# Patient Record
Sex: MALE | Race: WHITE | NOT HISPANIC OR LATINO | URBAN - METROPOLITAN AREA
[De-identification: names, ages, dates, MRNs, and addresses within clinical notes are randomized per-mention and may not be internally consistent; named-entity substitution may affect disease eponyms.]

---

## 2017-01-18 ENCOUNTER — GENERIC CONVERSION - ENCOUNTER (OUTPATIENT)
Dept: OTHER | Facility: OTHER | Age: 52
End: 2017-01-18

## 2018-01-11 ENCOUNTER — ALLSCRIPTS OFFICE VISIT (OUTPATIENT)
Dept: OTHER | Facility: OTHER | Age: 53
End: 2018-01-11

## 2018-01-11 DIAGNOSIS — Z13.1 ENCOUNTER FOR SCREENING FOR DIABETES MELLITUS: ICD-10-CM

## 2018-01-11 DIAGNOSIS — Z13.220 ENCOUNTER FOR SCREENING FOR LIPOID DISORDERS: ICD-10-CM

## 2018-01-11 DIAGNOSIS — Z12.5 ENCOUNTER FOR SCREENING FOR MALIGNANT NEOPLASM OF PROSTATE: ICD-10-CM

## 2018-01-12 NOTE — PROGRESS NOTES
Assessment   1  Acute sinusitis (461 9) (J01 90)  2  Screening for depression (V79 0) (Z13 89)  3  Screening for diabetes mellitus (V77 1) (Z13 1)1   4  Lipid screening (V77 91) (Z13 220)1   5  Screening PSA (prostate specific antigen) (V76 44) (Z12 5)1      1 Amended By: Susana Correa; Jan 11 2018 11:51 AM EST      Plan   Acute sinusitis    · Start: Cefuroxime Axetil 500 MG Oral Tablet; TAKE 1 TABLET EVERY 12 HOURS DAILY  Screening for depression    · *VB-Depression Screening; Status:Complete - Retrospective By Protocol Authorization;      Done: [id number] 11:30AM   · *VB-Depression Screening ; every 1 year; Last 91PVC8240; Next [id number];    Status:Active      I suspect the pt has sinusitis  Recommended rest, fluids and supportive care and rxd abx  Discussion/Summary   Possible side effects of new medications were reviewed with the patient/guardian today  The treatment plan was reviewed with the patient/guardian  The patient/guardian understands and agrees with the treatment plan      Chief Complaint   PT C/O SINUS PRESSURE, CONGESTION AND A DRY COUGH  PT IS DUE FOR HIS FBW AND PE  I DID GIVEN A SCRIPT FOR HIS COLONOSCOPY  History of Present Illness   HPI: pt is here today c/o sinus sx has been sick for 1 week cough sinus pain/pressure headache fatigue had a sore throat the first day f/c n/v   did have sinus surgery 2 years ago and since then has not had any sinus infections to that he did get them very often          Active Problems   1  Acute sinusitis (461 9) (J01 90)  2  Allergic rhinitis (477 9) (J30 9)  3  Temporomandibular dysfunction syndrome (524 60) (M26 609)    Past Medical History   1  History of sebaceous cyst (V13 3) (Z87 2)  2  History of Knee Injury (959 7)  3  History of Pelvic Fracture (808 8)  Active Problems And Past Medical History Reviewed: The active problems and past medical history were reviewed and updated today  Family History   Family History   1   Family history of No Significant Family History  Family History Reviewed: The family history was reviewed and updated today  Social History    · Being A Social Drinker   · Former smoker (B97 67) (O38 729)  The social history was reviewed and updated today  Surgical History   1  History of Sinus Surgery  Surgical History Reviewed: The surgical history was reviewed and updated today  Current Meds   1  Fluticasone Propionate 50 MCG/ACT Nasal Suspension; Take 2 sprays daily in each     nostril; Therapy: 25UHD5099 to (Evaluate:19Fxl3141)  Requested for: 55JBT5134; Last     Rx:11Jan2013 Ordered  2  Nasacort AQ 55 MCG/ACT AERO; 1 spray each nostril daily; Therapy: (Recorded:11Jan2018) to Recorded     The medication list was reviewed and updated today  Allergies   1  Zithromax Z-Forrest TABS  2  No Known Environmental Allergies  3  No Known Food Allergies    Vitals    Recorded: 36QVL0930 11:23AM   Temperature 97 7 F   Heart Rate 68   Systolic 179 mm Hg   Diastolic 70 mm Hg   Height 5 ft 9 in   Weight 224 lb 8 oz   BMI Calculated 33 15 kg/m2   BSA Calculated 2 17 m2   O2 Saturation 97     Physical Exam        Constitutional      General appearance: No acute distress, well appearing and well nourished  Eyes      Conjunctiva and lids: No swelling, erythema, or discharge  Ears, Nose, Mouth, and Throat      External inspection of ears and nose: Normal        Otoscopic examination: Tympanic membrance translucent with normal light reflex  Canals patent without erythema  Nasal mucosa, septum, and turbinates: Abnormal  -- (+ ttp over frontal sinuses) There was clear rhinorrhea from both nares  The bilateral nasal mucosa was boggy  Oropharynx: Abnormal   The posterior pharynx was erythematous, but-- did not have an exudate  Pulmonary      Respiratory effort: No increased work of breathing or signs of respiratory distress         Auscultation of lungs: Clear to auscultation, equal breath sounds bilaterally, no wheezes, no rales, no rhonci  Skin      Skin and subcutaneous tissue: Normal without rashes or lesions  Results/Data   *VB-Depression Screening 81XHA4819 11:30AM Carl Angeles      Test Name Result Flag Reference   Depression Scale Result      Depression Screen - Negative For Symptoms      PHQ-2 Adult Depression Screening 83XWG9323 11:29AM User, Ahs      Test Name Result Flag Reference   PHQ-2 Adult Depression Score 0     Over the last two weeks, how often have you been bothered by any of the following problems? Little interest or pleasure in doing things: Not at all - 0     Feeling down, depressed, or hopeless: Not at all - 0   PHQ-2 Adult Depression Screening Negative          Message   Return to work or school:    Emi Smith is under my professional care  He was seen in my office on 1/11/17        Please excuse the patient from work 1/6 and 1/7 asn well as 1/10 and 1/11 2017        Yen Martinez MD       Signatures    Electronically signed by : SARA Goel ; Jan 11 2018 11:52AM EST                       (Author)

## 2018-01-23 VITALS
OXYGEN SATURATION: 97 % | BODY MASS INDEX: 33.25 KG/M2 | WEIGHT: 224.5 LBS | HEIGHT: 69 IN | SYSTOLIC BLOOD PRESSURE: 124 MMHG | DIASTOLIC BLOOD PRESSURE: 70 MMHG | TEMPERATURE: 97.7 F | HEART RATE: 68 BPM

## 2018-02-07 LAB
ALBUMIN SERPL-MCNC: 4.4 G/DL (ref 3.6–5.1)
ALBUMIN/GLOB SERPL: 1.8 (CALC) (ref 1–2.5)
ALP SERPL-CCNC: 100 U/L (ref 40–115)
ALT SERPL-CCNC: 19 U/L (ref 9–46)
AST SERPL-CCNC: 20 U/L (ref 10–35)
BILIRUB SERPL-MCNC: 1.9 MG/DL (ref 0.2–1.2)
BUN SERPL-MCNC: 9 MG/DL (ref 7–25)
BUN/CREAT SERPL: ABNORMAL (CALC) (ref 6–22)
CALCIUM SERPL-MCNC: 9 MG/DL (ref 8.6–10.3)
CHLORIDE SERPL-SCNC: 103 MMOL/L (ref 98–110)
CHOLEST SERPL-MCNC: 134 MG/DL
CHOLEST/HDLC SERPL: 2.3 (CALC)
CO2 SERPL-SCNC: 28 MMOL/L (ref 20–31)
CREAT SERPL-MCNC: 0.92 MG/DL (ref 0.7–1.33)
GLOBULIN SER CALC-MCNC: 2.4 G/DL (CALC) (ref 1.9–3.7)
GLUCOSE SERPL-MCNC: 91 MG/DL (ref 65–99)
HDLC SERPL-MCNC: 59 MG/DL
LDLC SERPL CALC-MCNC: 56 MG/DL (CALC)
NONHDLC SERPL-MCNC: 75 MG/DL (CALC)
POTASSIUM SERPL-SCNC: 3.5 MMOL/L (ref 3.5–5.3)
PROT SERPL-MCNC: 6.8 G/DL (ref 6.1–8.1)
PSA SERPL-MCNC: 0.5 NG/ML
SL AMB EGFR AFRICAN AMERICAN: 111 ML/MIN/1.73M2
SL AMB EGFR NON AFRICAN AMERICAN: 96 ML/MIN/1.73M2
SODIUM SERPL-SCNC: 140 MMOL/L (ref 135–146)
TRIGL SERPL-MCNC: 107 MG/DL

## 2018-02-26 NOTE — MISCELLANEOUS
Message  Return to work or school:   Lorenzo Little is under my professional care  He was seen in my office on 1/11/17       Please excuse the patient from work 1/6 and 1/7 asn well as 1/10 and 1/11 2017     Tim Jarvis MD       Signatures   Electronically signed by : SARA Fu ; Jan 11 2018 11:48AM EST                       (Author)    Electronically signed by : SARA Fu ; Jan 11 2018 11:52AM EST                       (Author)

## 2018-03-13 RX ORDER — FLUTICASONE PROPIONATE 50 MCG
SPRAY, SUSPENSION (ML) NASAL
COMMUNITY
Start: 2013-01-11 | End: 2018-07-13

## 2018-03-13 RX ORDER — TRIAMCINOLONE ACETONIDE 55 UG/1
1 SPRAY, METERED NASAL DAILY
COMMUNITY

## 2018-03-14 ENCOUNTER — OFFICE VISIT (OUTPATIENT)
Dept: FAMILY MEDICINE CLINIC | Facility: CLINIC | Age: 53
End: 2018-03-14
Payer: COMMERCIAL

## 2018-03-14 VITALS
SYSTOLIC BLOOD PRESSURE: 136 MMHG | OXYGEN SATURATION: 97 % | TEMPERATURE: 97.9 F | HEIGHT: 69 IN | BODY MASS INDEX: 31.22 KG/M2 | HEART RATE: 70 BPM | WEIGHT: 210.8 LBS | DIASTOLIC BLOOD PRESSURE: 86 MMHG

## 2018-03-14 DIAGNOSIS — Z12.11 ENCOUNTER FOR SCREENING FOR MALIGNANT NEOPLASM OF COLON: Primary | ICD-10-CM

## 2018-03-14 DIAGNOSIS — G56.01 CARPAL TUNNEL SYNDROME ON RIGHT: ICD-10-CM

## 2018-03-14 DIAGNOSIS — Z00.00 WELL ADULT EXAM: Primary | ICD-10-CM

## 2018-03-14 DIAGNOSIS — Z12.11 SCREENING FOR COLON CANCER: ICD-10-CM

## 2018-03-14 PROCEDURE — 99396 PREV VISIT EST AGE 40-64: CPT | Performed by: FAMILY MEDICINE

## 2018-03-14 NOTE — PROGRESS NOTES
Assessment/Plan:    No problem-specific Assessment & Plan notes found for this encounter  Diagnoses and all orders for this visit:    Well adult exam    Screening for colon cancer    Carpal tunnel syndrome on right          We reviewed his labs and they are acceptable  I asked him to use vitamin B6 and a right wrist splint when he rides his motorcycle  Subjective:      Patient ID: Dagmar Gay is a 47 y o  male  Patient is here for annual well exam   He is 47years old and has not had his colonoscopy yet  He sees the eye doctor on an annual basis  Recently seen by ENT for some positional vertigo  Has not seen a dermatologist   Has no family history of heart disease  Unsure if there is other cancers in the family  Gets occasional numbness in his right wrist when he drives his motorcycle  The following portions of the patient's history were reviewed and updated as appropriate: allergies, current medications, past family history, past medical history, past social history, past surgical history and problem list     Review of Systems   Constitutional: Negative for activity change, appetite change, chills, diaphoresis, fatigue and unexpected weight change  HENT: Negative for congestion, ear discharge, ear pain, hearing loss, nosebleeds and rhinorrhea  Eyes: Negative for pain, redness, itching and visual disturbance  Respiratory: Negative for cough, choking, chest tightness and shortness of breath  Cardiovascular: Negative for chest pain and leg swelling  Gastrointestinal: Negative for abdominal pain, blood in stool, constipation, diarrhea and nausea  Endocrine: Negative for cold intolerance, polydipsia and polyphagia  Genitourinary: Negative for dysuria, frequency, hematuria and urgency  Musculoskeletal: Negative for arthralgias, back pain, gait problem, joint swelling, neck pain and neck stiffness  Skin: Negative for color change and rash     Allergic/Immunologic: Negative for environmental allergies and food allergies  Neurological: Positive for light-headedness and numbness  Negative for dizziness, tremors, seizures, speech difficulty and headaches  Hematological: Negative for adenopathy  Does not bruise/bleed easily  Psychiatric/Behavioral: Negative for behavioral problems, dysphoric mood, hallucinations and self-injury  Objective:  Vitals:    03/14/18 0946 03/14/18 1005   BP: 140/100 136/86   Pulse: 70    Temp: 97 9 °F (36 6 °C)    SpO2: 97%    Weight: 95 6 kg (210 lb 12 8 oz)    Height: 5' 9" (1 753 m)       Physical Exam   Constitutional: He is oriented to person, place, and time  He appears well-developed and well-nourished  No distress  HENT:   Head: Normocephalic and atraumatic  Right Ear: External ear normal    Left Ear: External ear normal    Nose: Nose normal    Mouth/Throat: Oropharynx is clear and moist  No oropharyngeal exudate  Eyes: Conjunctivae and EOM are normal  Pupils are equal, round, and reactive to light  Right eye exhibits no discharge  Left eye exhibits no discharge  No scleral icterus  Neck: Normal range of motion  Neck supple  No thyromegaly present  Cardiovascular: Normal rate, regular rhythm and normal heart sounds  No murmur heard  Pulmonary/Chest: Effort normal and breath sounds normal  He has no wheezes  He has no rales  Abdominal: Soft  Bowel sounds are normal  He exhibits no mass  There is no tenderness  There is no guarding  Musculoskeletal: Normal range of motion  He exhibits no edema or tenderness  Lymphadenopathy:     He has no cervical adenopathy  Neurological: He is alert and oriented to person, place, and time  He has normal reflexes  Skin: Skin is warm and dry  He is not diaphoretic  Psychiatric: He has a normal mood and affect   Judgment and thought content normal

## 2018-03-14 NOTE — PATIENT INSTRUCTIONS
COPD: Breathing Exercises   AMBULATORY CARE:   Breathing exercises  may help manage your symptoms of COPD, such as shortness of breath and difficulty breathing  The exercises may strengthen the muscles you use to breathe  They may also help you get air easier when you are having trouble breathing  Your healthcare provider will tell you how often to do these exercises  Deep breathing and coughing:  Take a deep breath and hold it for as long as you can  Let the air out and then cough strongly  Deep breaths help open your airway  You may be given an incentive spirometer to help you take deep breaths  Put the plastic piece in your mouth and take a slow, deep breath  Then let the air out and cough  Repeat these steps 10 times every hour  This will decrease your risk for a lung infection  Pursed-lip breathing:  Pursed-lip breathing can be especially helpful before you start an activity  It can also be used any time you feel short of breath  You can do this exercise by following these steps:  · Breathe in through your nose  Use the muscles in your abdomen to help fill your lungs with air  · Slowly breathe out through your mouth with your lips slightly puckered  You should make a quiet hissing sound as you breathe out  · Try to take twice as long to breathe out as it did to breathe in  This helps you get rid of as much air from your lungs as possible  · Repeat this exercise several times  Once you are used to doing pursed-lip breathing, you can do it any time you need more air  Diaphragmatic breathing: This exercise will help strengthen the muscles that you use to breathe  You can do this exercise by following these steps:  · Place one hand just below your ribs  Place your other hand in the middle of your chest over your breastbone  · Breathe in slowly through your nose, as deeply as you can  · Breathe out slowly through pursed lips   As you breathe out, tighten the muscles just below your ribs  Use your hand to gently push in and up while you tighten the muscles  · Diaphragmatic breathing takes practice  You may need to practice this many times a day  Slowly increase the amount of time you spend during each practice session  Follow up with your healthcare provider as directed:  Write down your questions so you remember to ask them during your visits  © 2017 [address] [employer] Information is for End User's use only and may not be sold, redistributed or otherwise used for commercial purposes  All illustrations and images included in CareNotes® are the copyrighted property of [employer] , [facility] [facility]  or Mike Garvin  The above information is an  only  It is not intended as medical advice for individual conditions or treatments  Talk to your doctor, nurse or pharmacist before following any medical regimen to see if it is safe and effective for you

## 2018-07-09 ENCOUNTER — TELEPHONE (OUTPATIENT)
Dept: FAMILY MEDICINE CLINIC | Facility: CLINIC | Age: 53
End: 2018-07-09

## 2018-07-09 NOTE — TELEPHONE ENCOUNTER
I am going to need to see these lab results  Can you please call the gastroenterologist and have them send the results to us?

## 2018-07-09 NOTE — TELEPHONE ENCOUNTER
Pt called in stating that their colon Dr Shelley Genao pt to get an injection of 1000mg of B12 due to blood work results   Please adv

## 2018-07-10 NOTE — TELEPHONE ENCOUNTER
Can someone please check on this to see if we got the results? If not please let the patient know and perhaps he can get us the results

## 2018-07-13 ENCOUNTER — OFFICE VISIT (OUTPATIENT)
Dept: FAMILY MEDICINE CLINIC | Facility: CLINIC | Age: 53
End: 2018-07-13
Payer: COMMERCIAL

## 2018-07-13 VITALS
TEMPERATURE: 97.9 F | BODY MASS INDEX: 31.92 KG/M2 | HEIGHT: 69 IN | OXYGEN SATURATION: 98 % | HEART RATE: 74 BPM | DIASTOLIC BLOOD PRESSURE: 80 MMHG | WEIGHT: 215.5 LBS | SYSTOLIC BLOOD PRESSURE: 120 MMHG

## 2018-07-13 DIAGNOSIS — R79.82 ELEVATED C-REACTIVE PROTEIN (CRP): ICD-10-CM

## 2018-07-13 DIAGNOSIS — E53.8 VITAMIN B 12 DEFICIENCY: Primary | ICD-10-CM

## 2018-07-13 DIAGNOSIS — K63.3 EROSION OF TERMINAL ILEUM: ICD-10-CM

## 2018-07-13 DIAGNOSIS — K57.30 DIVERTICULOSIS OF LARGE INTESTINE WITHOUT HEMORRHAGE: ICD-10-CM

## 2018-07-13 PROCEDURE — 99214 OFFICE O/P EST MOD 30 MIN: CPT | Performed by: FAMILY MEDICINE

## 2018-07-13 RX ORDER — CYANOCOBALAMIN 1000 UG/ML
1000 INJECTION INTRAMUSCULAR; SUBCUTANEOUS
Status: SHIPPED | OUTPATIENT
Start: 2018-07-13 | End: 2019-07-08

## 2018-07-13 RX ADMIN — CYANOCOBALAMIN 1000 MCG: 1000 INJECTION INTRAMUSCULAR; SUBCUTANEOUS at 12:04

## 2018-07-13 NOTE — PROGRESS NOTES
Assessment/Plan:    No problem-specific Assessment & Plan notes found for this encounter  Diagnoses and all orders for this visit:    Vitamin B 12 deficiency  -     cyanocobalamin injection 1,000 mcg; Inject 1 mL (1,000 mcg total) into a muscle every 30 (thirty) days     Patient does indeed have a B12 deficiency which may very well be caused by the fact that he is not absorbing B12 due to would ever diseases going on in his colon  He was given a B12 injection today and we discussed that as this is likely malabsorption issue he will probably need a B12 injection monthly  At this point he will follow up in 4 weeks for repeat injection  Erosion of terminal ileum  Elevated C-reactive protein (CRP)    There is obviously something going on with the patient's colon  The gastroenterologist is worried it could be Crohn's disease  He will go for his MRI Thursday as scheduled and follow up with the gastroenterologist   The CRP and alk-phos are slightly elevated which is an indication of something inflammatory going on  We did discuss all of his lab results and his colonoscopy in detail today  Diverticulosis of large intestine without hemorrhage          Subjective:   Chief Complaint   Patient presents with    Follow-up     PT IS HERE TO REVIEW FBW AND COLONOSCOPY  PT REFERRED BY GI TO HAVE VITAMIN B12 INJECTION IN THE OFFICE  Patient ID: Vinny Hernandez is a 47 y o  male      The patient is here today to review his recent lab results and colonoscopy in to get a B12 injection  I saw him for routine health maintenance exam earlier this year and referred him for screening colonoscopy he [facility] Dr Ellison Citizen  There were some abnormalities on the colonoscopy, he had erosions/ulcers in the terminal ileum, a polyp, and erosion at the ileocecal valve, diverticulosis, and nonbleeding hemorrhoids  The GI doctors worried about Crohn's disease because of the erosions/ulcers  He ordered some blood work which the patient had done on June 11th  The blood work had a few abnormalities  The alkaline phosphatase was slightly elevated, the CRP was slightly elevated, and the B12 was low at 176  He was advised by the GI doctor to get a B12 injection  This is why he is here today  He is not sure if he is fatigued, he works 2 different jobs and often only gets 4 hr asleep between  Sometimes works night shifts  He is always fatigued  He denies any neuropathy symptoms  He has had GI symptoms for years but always figured it was irritable bowel syndrome  He actually feels like his GI symptoms are worse since the colonoscopy  He has an MRI scheduled on Thursday as ordered by the gastroenterologist  He will then have follow-up with GI        The following portions of the patient's history were reviewed and updated as appropriate: allergies, current medications, past family history, past medical history, past social history, past surgical history and problem list     Review of Systems      Objective:  Vitals:    07/13/18 1103   BP: 120/80   Pulse: 74   Temp: 97 9 °F (36 6 °C)   SpO2: 98%   Weight: 97 8 kg (215 lb 8 oz)   Height: 5' 9" (1 753 m)      Physical Exam   Constitutional: He is oriented to person, place, and time  He appears well-developed and well-nourished  No distress  Neurological: He is alert and oriented to person, place, and time  No cranial nerve deficit  Coordination normal    Normal sensory    Skin: Skin is warm and dry  No rash noted  He is not diaphoretic  No erythema  Psychiatric: He has a normal mood and affect   His behavior is normal  Judgment and thought content normal

## 2018-07-13 NOTE — PATIENT INSTRUCTIONS
Vitamin B12 Deficiency   WHAT YOU NEED TO KNOW:   Vitamin B12 deficiency is a low level of vitamin B12 in your body  Vitamin B12 is only found in foods that come from animal sources such as fish, beef, dairy products, and eggs  Vitamin B12 deficiency should be treated as early as possible  Without treatment, it can cause permanent nerve damage over time  DISCHARGE INSTRUCTIONS:   Medicines:   · Vitamin B12 supplements  may be needed to increase your levels back to normal      · Take your medicine as directed  Contact your healthcare provider if you think your medicine is not helping or if you have side effects  Tell him or her if you are allergic to any medicine  Keep a list of the medicines, vitamins, and herbs you take  Include the amounts, and when and why you take them  Bring the list or the pill bottles to follow-up visits  Carry your medicine list with you in case of an emergency  Follow up with your healthcare provider as directed:  Write down your questions so you remember to ask them during your visits     Good sources of vitamin B12:   · 3 ounces of cooked clams, 84 1 mcg    · 3 ounces of cooked beef liver, 70 7 mcg    · Fortified breakfast cereals, 1 5 to 6 mcg per serving    · 3 ounces of salmon, rainbow trout, or canned tuna fish, 2 5 to 4 8 mcg    · 3 ounces of top sirloin beef, 1 4 mcg     · 1 cup of milk or yogurt, 1 1 to 1 2 mcg    · 1 cup of a soy milk product, 0 9 to 3 3 mcg    · 1 ounce of a meat substitute, 0 5 to 1 2 mcg    · 1 ounce Swiss cheese, 0 9 mcg    · 1 large egg, 0 6 mcg  Amount of vitamin B12 you need each day:   · Infants 0 to 12 months: 0 4 micrograms (mcg) to 0 5 mcg    · Children 1 to 3 years: 0 9 mcg    · Children 4 to 8 years: 1 2 mcg    · Children 9 to 13 years: 1 8 mcg    · Children over 14 years and adults: 1 8 mcg    · Pregnant women and adolescents (over 14 years): 2 6 mcg    · Breastfeeding women and adolescents (over 14 years): 2 8 mcg  Contact your healthcare provider if:   · You or your child continues to have symptoms, or your symptoms get worse  · You have questions or concerns about your condition or care  © 2017 [employer] Information is for End User's use only and may not be sold, redistributed or otherwise used for commercial purposes  All illustrations and images included in CareNotes® are the copyrighted property of [employer]  or Mike Garvin  The above information is an  only  It is not intended as medical advice for individual conditions or treatments  Talk to your doctor, nurse or pharmacist before following any medical regimen to see if it is safe and effective for you

## 2018-08-15 ENCOUNTER — CLINICAL SUPPORT (OUTPATIENT)
Dept: FAMILY MEDICINE CLINIC | Facility: CLINIC | Age: 53
End: 2018-08-15
Payer: COMMERCIAL

## 2018-08-15 DIAGNOSIS — E53.8 VITAMIN B12 DEFICIENCY: Primary | ICD-10-CM

## 2018-08-15 PROCEDURE — 96372 THER/PROPH/DIAG INJ SC/IM: CPT

## 2018-08-15 RX ADMIN — CYANOCOBALAMIN 1000 MCG: 1000 INJECTION INTRAMUSCULAR; SUBCUTANEOUS at 14:22

## 2018-08-15 NOTE — PROGRESS NOTES
Patient presents today for his monthly Vitamin B12 injection  Administered cyanocobalamin 1 ML into the left deltoid  Patient states that he would like to know when he can stop getting the injections and switch over to taking tablets instead  He informed me that the Vitamin B12 therapy is being prescribed by his GI doctor, so I advised him to call them with his question

## 2018-12-05 ENCOUNTER — CLINICAL SUPPORT (OUTPATIENT)
Dept: FAMILY MEDICINE CLINIC | Facility: CLINIC | Age: 53
End: 2018-12-05
Payer: COMMERCIAL

## 2018-12-05 DIAGNOSIS — Z23 NEED FOR HEPATITIS A IMMUNIZATION: Primary | ICD-10-CM

## 2018-12-05 DIAGNOSIS — K50.00 CROHN'S DISEASE OF SMALL INTESTINE WITHOUT COMPLICATION (HCC): ICD-10-CM

## 2018-12-05 DIAGNOSIS — Z23 NEED FOR HEPATITIS B VACCINATION: ICD-10-CM

## 2018-12-05 DIAGNOSIS — Z77.21 HX OF EXPOSURE TO HAZARDOUS BODILY FLUIDS: ICD-10-CM

## 2018-12-05 PROCEDURE — 90471 IMMUNIZATION ADMIN: CPT

## 2018-12-05 PROCEDURE — 96372 THER/PROPH/DIAG INJ SC/IM: CPT

## 2018-12-05 PROCEDURE — 90632 HEPA VACCINE ADULT IM: CPT

## 2018-12-05 PROCEDURE — 90746 HEPB VACCINE 3 DOSE ADULT IM: CPT

## 2018-12-05 PROCEDURE — 90472 IMMUNIZATION ADMIN EACH ADD: CPT

## 2018-12-05 RX ADMIN — CYANOCOBALAMIN 1000 MCG: 1000 INJECTION INTRAMUSCULAR; SUBCUTANEOUS at 15:11

## 2018-12-19 ENCOUNTER — CLINICAL SUPPORT (OUTPATIENT)
Dept: FAMILY MEDICINE CLINIC | Facility: CLINIC | Age: 53
End: 2018-12-19
Payer: COMMERCIAL

## 2018-12-19 DIAGNOSIS — E53.8 VITAMIN B12 DEFICIENCY: Primary | ICD-10-CM

## 2018-12-19 PROCEDURE — 99211 OFF/OP EST MAY X REQ PHY/QHP: CPT

## 2018-12-19 RX ADMIN — CYANOCOBALAMIN 1000 MCG: 1000 INJECTION INTRAMUSCULAR; SUBCUTANEOUS at 14:18

## 2019-01-23 ENCOUNTER — CLINICAL SUPPORT (OUTPATIENT)
Dept: FAMILY MEDICINE CLINIC | Facility: CLINIC | Age: 54
End: 2019-01-23
Payer: COMMERCIAL

## 2019-01-23 DIAGNOSIS — E53.8 VITAMIN B 12 DEFICIENCY: Primary | ICD-10-CM

## 2019-01-23 PROCEDURE — 96372 THER/PROPH/DIAG INJ SC/IM: CPT

## 2019-01-23 RX ADMIN — CYANOCOBALAMIN 1000 MCG: 1000 INJECTION INTRAMUSCULAR; SUBCUTANEOUS at 16:08

## 2019-02-21 ENCOUNTER — CLINICAL SUPPORT (OUTPATIENT)
Dept: FAMILY MEDICINE CLINIC | Facility: CLINIC | Age: 54
End: 2019-02-21
Payer: COMMERCIAL

## 2019-02-21 DIAGNOSIS — E53.8 VITAMIN B 12 DEFICIENCY: Primary | ICD-10-CM

## 2019-02-21 PROCEDURE — 96372 THER/PROPH/DIAG INJ SC/IM: CPT

## 2019-02-21 RX ORDER — CYANOCOBALAMIN 1000 UG/ML
1000 INJECTION INTRAMUSCULAR; SUBCUTANEOUS
Status: SHIPPED | OUTPATIENT
Start: 2019-02-21

## 2019-02-21 RX ADMIN — CYANOCOBALAMIN 1000 MCG: 1000 INJECTION INTRAMUSCULAR; SUBCUTANEOUS at 11:48

## 2019-04-18 ENCOUNTER — TELEPHONE (OUTPATIENT)
Dept: FAMILY MEDICINE CLINIC | Facility: CLINIC | Age: 54
End: 2019-04-18

## 2019-04-18 DIAGNOSIS — E53.8 VITAMIN B 12 DEFICIENCY: Primary | ICD-10-CM

## 2019-04-18 DIAGNOSIS — Z11.59 ENCOUNTER FOR HEPATITIS C SCREENING TEST FOR LOW RISK PATIENT: ICD-10-CM

## 2019-04-18 DIAGNOSIS — Z13.1 SCREENING FOR DIABETES MELLITUS: ICD-10-CM

## 2019-04-18 DIAGNOSIS — Z13.29 SCREENING FOR THYROID DISORDER: ICD-10-CM

## 2019-04-18 DIAGNOSIS — Z12.5 SCREENING PSA (PROSTATE SPECIFIC ANTIGEN): ICD-10-CM

## 2019-04-18 DIAGNOSIS — Z13.220 SCREENING, LIPID: ICD-10-CM

## 2019-05-28 ENCOUNTER — OFFICE VISIT (OUTPATIENT)
Dept: FAMILY MEDICINE CLINIC | Facility: CLINIC | Age: 54
End: 2019-05-28
Payer: COMMERCIAL

## 2019-05-28 VITALS
OXYGEN SATURATION: 97 % | SYSTOLIC BLOOD PRESSURE: 122 MMHG | DIASTOLIC BLOOD PRESSURE: 82 MMHG | BODY MASS INDEX: 31.4 KG/M2 | WEIGHT: 212 LBS | TEMPERATURE: 98.4 F | HEART RATE: 72 BPM | HEIGHT: 69 IN

## 2019-05-28 DIAGNOSIS — J20.9 ACUTE BRONCHITIS, UNSPECIFIED ORGANISM: ICD-10-CM

## 2019-05-28 DIAGNOSIS — J01.00 ACUTE NON-RECURRENT MAXILLARY SINUSITIS: Primary | ICD-10-CM

## 2019-05-28 PROCEDURE — 99214 OFFICE O/P EST MOD 30 MIN: CPT | Performed by: FAMILY MEDICINE

## 2019-05-28 RX ORDER — MESALAMINE 800 MG/1
TABLET, DELAYED RELEASE ORAL
COMMUNITY
Start: 2018-08-02

## 2019-05-28 RX ORDER — PROMETHAZINE HYDROCHLORIDE AND CODEINE PHOSPHATE 6.25; 1 MG/5ML; MG/5ML
5 SYRUP ORAL EVERY 4 HOURS PRN
Qty: 120 ML | Refills: 0 | Status: SHIPPED | OUTPATIENT
Start: 2019-05-28

## 2019-05-28 RX ORDER — CEFUROXIME AXETIL 500 MG/1
500 TABLET ORAL EVERY 12 HOURS SCHEDULED
Qty: 20 TABLET | Refills: 0 | Status: SHIPPED | OUTPATIENT
Start: 2019-05-28 | End: 2019-06-07

## 2019-07-11 ENCOUNTER — CLINICAL SUPPORT (OUTPATIENT)
Dept: FAMILY MEDICINE CLINIC | Facility: CLINIC | Age: 54
End: 2019-07-11
Payer: COMMERCIAL

## 2019-07-11 DIAGNOSIS — Z23 NEED FOR VACCINATION: Primary | ICD-10-CM

## 2019-07-11 PROCEDURE — 90746 HEPB VACCINE 3 DOSE ADULT IM: CPT

## 2019-07-11 PROCEDURE — 90632 HEPA VACCINE ADULT IM: CPT

## 2019-07-11 PROCEDURE — 90472 IMMUNIZATION ADMIN EACH ADD: CPT

## 2019-07-11 PROCEDURE — 90471 IMMUNIZATION ADMIN: CPT

## 2019-11-19 ENCOUNTER — CLINICAL SUPPORT (OUTPATIENT)
Dept: FAMILY MEDICINE CLINIC | Facility: CLINIC | Age: 54
End: 2019-11-19
Payer: COMMERCIAL

## 2019-11-19 DIAGNOSIS — Z23 NEED FOR VACCINATION: Primary | ICD-10-CM

## 2019-11-19 PROCEDURE — 90471 IMMUNIZATION ADMIN: CPT

## 2019-11-19 PROCEDURE — 90746 HEPB VACCINE 3 DOSE ADULT IM: CPT

## 2020-12-23 ENCOUNTER — TELEMEDICINE (OUTPATIENT)
Dept: FAMILY MEDICINE CLINIC | Facility: CLINIC | Age: 55
End: 2020-12-23

## 2020-12-23 VITALS — TEMPERATURE: 98 F | WEIGHT: 206 LBS | BODY MASS INDEX: 31.22 KG/M2 | HEIGHT: 68 IN

## 2020-12-23 DIAGNOSIS — J01.40 ACUTE NON-RECURRENT PANSINUSITIS: Primary | ICD-10-CM

## 2020-12-23 PROCEDURE — 99214 OFFICE O/P EST MOD 30 MIN: CPT | Performed by: FAMILY MEDICINE

## 2020-12-23 RX ORDER — CEFUROXIME AXETIL 500 MG/1
500 TABLET ORAL EVERY 12 HOURS SCHEDULED
Qty: 20 TABLET | Refills: 0 | Status: SHIPPED | OUTPATIENT
Start: 2020-12-23 | End: 2021-01-02

## 2023-12-08 ENCOUNTER — OFFICE VISIT (OUTPATIENT)
Dept: FAMILY MEDICINE CLINIC | Facility: CLINIC | Age: 58
End: 2023-12-08

## 2023-12-08 VITALS
SYSTOLIC BLOOD PRESSURE: 178 MMHG | HEART RATE: 66 BPM | DIASTOLIC BLOOD PRESSURE: 110 MMHG | TEMPERATURE: 96.6 F | WEIGHT: 228 LBS | BODY MASS INDEX: 33.77 KG/M2 | OXYGEN SATURATION: 98 % | HEIGHT: 69 IN

## 2023-12-08 DIAGNOSIS — R63.1 POLYDIPSIA: ICD-10-CM

## 2023-12-08 DIAGNOSIS — Z13.1 SCREENING FOR DIABETES MELLITUS: ICD-10-CM

## 2023-12-08 DIAGNOSIS — Z13.29 SCREENING FOR THYROID DISORDER: ICD-10-CM

## 2023-12-08 DIAGNOSIS — Z13.0 SCREENING FOR DEFICIENCY ANEMIA: ICD-10-CM

## 2023-12-08 DIAGNOSIS — I10 HYPERTENSION, UNSPECIFIED TYPE: Primary | ICD-10-CM

## 2023-12-08 DIAGNOSIS — Z12.5 SCREENING FOR PROSTATE CANCER: ICD-10-CM

## 2023-12-08 DIAGNOSIS — Z13.220 SCREENING CHOLESTEROL LEVEL: ICD-10-CM

## 2023-12-08 PROCEDURE — 99213 OFFICE O/P EST LOW 20 MIN: CPT | Performed by: NURSE PRACTITIONER

## 2023-12-08 RX ORDER — MULTIVITAMIN
1 TABLET ORAL DAILY
COMMUNITY

## 2023-12-08 RX ORDER — LISINOPRIL 10 MG/1
10 TABLET ORAL DAILY
Qty: 30 TABLET | Refills: 1 | Status: SHIPPED | OUTPATIENT
Start: 2023-12-08 | End: 2023-12-21

## 2023-12-08 RX ORDER — LANOLIN ALCOHOL/MO/W.PET/CERES
1 CREAM (GRAM) TOPICAL 3 TIMES DAILY
COMMUNITY

## 2023-12-08 NOTE — PROGRESS NOTES
Name: Jesus Miller      : 1965      MRN: [id number]  Encounter Provider: NILAY Mccormick  Encounter Date: 2023   Encounter department: [facility]    Assessment & Plan     1. Hypertension, unspecified type  Assessment & Plan:  Start lisinopril 10mg once daily, to help lower blood pressure    Stop SUDAFED immediately  Use fluticasone nasal spray and daily antihistamine like claritin, allegra or zrytec once daily with no decongestant in it    Check blood work fasting with [facility]      2. Screening for thyroid disorder  -     TSH, 3rd generation with Free T4 reflex; Future  -     TSH, 3rd generation with Free T4 reflex    3. Screening cholesterol level  -     Lipid Panel with Direct LDL reflex; Future  -     Lipid Panel with Direct LDL reflex    4. Screening for deficiency anemia  -     CBC and differential; Future  -     CBC and differential    5. Screening for diabetes mellitus  -     Comprehensive metabolic panel; Future  -     Comprehensive metabolic panel    6. Screening for prostate cancer  -     PSA, total and free; Future  -     PSA, total and free    7. Polydipsia  -     Hemoglobin A1c (w/out EAG); Future  -     Hemoglobin A1c (w/out EAG)      Depression Screening and Follow-up Plan: Patient was screened for depression during today's encounter. They screened negative with a PHQ-2 score of 0.        Subjective      Saw eye doctor this week0- [facility]- left rear eye had broken vessels, BP was elevated in their office. They wanted him seen for BP and possible diabetes. Needs to see a retina specialist.    Has been taking last few months taking daily sudafed and 2 advils daily for last 2-3 months. Will also take mucinex at times. Getting a lot of sinus pain and pressure, draining down the back of the throat, ear pressure.       Hypertension  This is a new problem. The current episode started today. Associated symptoms include headaches (rare- more with allergy  "congestion). Pertinent negatives include no anxiety, blurred vision, chest pain, malaise/fatigue, neck pain, orthopnea, palpitations, peripheral edema, PND, shortness of breath or sweats. There are no associated agents to hypertension. Risk factors for coronary artery disease include obesity. There are no compliance problems.      Review of Systems   Constitutional:  Negative for diaphoresis, fever and malaise/fatigue.   HENT: Negative.     Eyes:  Positive for pain. Negative for blurred vision, discharge, redness and itching.   Respiratory:  Negative for shortness of breath.    Cardiovascular:  Negative for chest pain, palpitations, orthopnea, leg swelling and PND.   Endocrine: Positive for polydipsia.   Genitourinary:  Negative for frequency.   Musculoskeletal:  Negative for neck pain.   Neurological:  Positive for headaches (rare- more with allergy congestion).   Hematological: Negative.    Psychiatric/Behavioral: Negative.         Current Outpatient Medications on File Prior to Visit   Medication Sig   • glucosamine-chondroitin 500-400 MG tablet Take 1 tablet by mouth 3 (three) times a day   • Misc Natural Products (Prostate Support) 300-15 MG TABS one daily   • Multiple Vitamin (multivitamin) tablet Take 1 tablet by mouth daily       Objective     BP (!) 178/110   Pulse 66   Temp (!) 96.6 °F (35.9 °C) (Tympanic)   Ht 5' 9\" (1.753 m)   Wt 103 kg (228 lb)   SpO2 98%   BMI 33.67 kg/m²     Physical Exam  Vitals and nursing note reviewed.   Constitutional:       Appearance: Normal appearance. He is obese.   HENT:      Head: Normocephalic.      Right Ear: Tympanic membrane, ear canal and external ear normal.      Left Ear: Tympanic membrane, ear canal and external ear normal.      Mouth/Throat:      Mouth: Mucous membranes are moist.      Pharynx: Oropharynx is clear.   Eyes:      Extraocular Movements: Extraocular movements intact.      Conjunctiva/sclera: Conjunctivae normal.      Pupils: Pupils are equal, " round, and reactive to light.   Cardiovascular:      Rate and Rhythm: Normal rate and regular rhythm.      Pulses:           Radial pulses are 1+ on the right side and 1+ on the left side.      Heart sounds: Normal heart sounds.   Pulmonary:      Effort: Pulmonary effort is normal.      Breath sounds: Normal breath sounds.   Musculoskeletal:      Cervical back: Normal range of motion.      Right lower leg: No edema.      Left lower leg: No edema.   Skin:     General: Skin is warm and dry.   Neurological:      Mental Status: He is alert and oriented to person, place, and time.       NILAY Mccormick

## 2023-12-08 NOTE — PATIENT INSTRUCTIONS
Start lisinopril 10mg once daily, to help lower blood pressure    Stop SUDAFED immediately  Use fluticasone nasal spray and daily antihistamine like claritin, allegra or zrytec once daily with no decongestant in it    Check blood work fasting with [facility]

## 2023-12-09 LAB
ALBUMIN SERPL-MCNC: 4.5 G/DL (ref 3.6–5.1)
ALBUMIN/GLOB SERPL: 1.8 (CALC) (ref 1–2.5)
ALP SERPL-CCNC: 84 U/L (ref 35–144)
ALT SERPL-CCNC: 16 U/L (ref 9–46)
AST SERPL-CCNC: 16 U/L (ref 10–35)
BASOPHILS # BLD AUTO: 39 CELLS/UL (ref 0–200)
BASOPHILS NFR BLD AUTO: 0.8 %
BILIRUB SERPL-MCNC: 1.1 MG/DL (ref 0.2–1.2)
BUN SERPL-MCNC: 12 MG/DL (ref 7–25)
BUN/CREAT SERPL: NORMAL (CALC) (ref 6–22)
CALCIUM SERPL-MCNC: 9.4 MG/DL (ref 8.6–10.3)
CHLORIDE SERPL-SCNC: 104 MMOL/L (ref 98–110)
CHOLEST SERPL-MCNC: 168 MG/DL
CHOLEST/HDLC SERPL: 2.3 (CALC)
CO2 SERPL-SCNC: 29 MMOL/L (ref 20–32)
CREAT SERPL-MCNC: 0.91 MG/DL (ref 0.7–1.3)
EOSINOPHIL # BLD AUTO: 123 CELLS/UL (ref 15–500)
EOSINOPHIL NFR BLD AUTO: 2.5 %
ERYTHROCYTE [DISTWIDTH] IN BLOOD BY AUTOMATED COUNT: 12.3 % (ref 11–15)
GFR/BSA.PRED SERPLBLD CYS-BASED-ARV: 98 ML/MIN/1.73M2
GLOBULIN SER CALC-MCNC: 2.5 G/DL (CALC) (ref 1.9–3.7)
GLUCOSE SERPL-MCNC: 89 MG/DL (ref 65–99)
HBA1C MFR BLD: 4.7 % OF TOTAL HGB
HCT VFR BLD AUTO: 42.8 % (ref 38.5–50)
HDLC SERPL-MCNC: 74 MG/DL
HGB BLD-MCNC: 15.3 G/DL (ref 13.2–17.1)
LDLC SERPL CALC-MCNC: 76 MG/DL (CALC)
LYMPHOCYTES # BLD AUTO: 867 CELLS/UL (ref 850–3900)
LYMPHOCYTES NFR BLD AUTO: 17.7 %
MCH RBC QN AUTO: 33.8 PG (ref 27–33)
MCHC RBC AUTO-ENTMCNC: 35.7 G/DL (ref 32–36)
MCV RBC AUTO: 94.7 FL (ref 80–100)
MONOCYTES # BLD AUTO: 343 CELLS/UL (ref 200–950)
MONOCYTES NFR BLD AUTO: 7 %
NEUTROPHILS # BLD AUTO: 3528 CELLS/UL (ref 1500–7800)
NEUTROPHILS NFR BLD AUTO: 72 %
NONHDLC SERPL-MCNC: 94 MG/DL (CALC)
PLATELET # BLD AUTO: 210 THOUSAND/UL (ref 140–400)
PMV BLD REES-ECKER: 10.8 FL (ref 7.5–12.5)
POTASSIUM SERPL-SCNC: 3.6 MMOL/L (ref 3.5–5.3)
PROT SERPL-MCNC: 7 G/DL (ref 6.1–8.1)
PSA FREE MFR SERPL: 33 % (CALC)
PSA FREE SERPL-MCNC: 0.1 NG/ML
PSA SERPL-MCNC: 0.3 NG/ML
RBC # BLD AUTO: 4.52 MILLION/UL (ref 4.2–5.8)
SODIUM SERPL-SCNC: 141 MMOL/L (ref 135–146)
TRIGL SERPL-MCNC: 97 MG/DL
TSH SERPL-ACNC: 2.44 MIU/L (ref 0.4–4.5)
WBC # BLD AUTO: 4.9 THOUSAND/UL (ref 3.8–10.8)

## 2023-12-11 ENCOUNTER — TELEPHONE (OUTPATIENT)
Dept: FAMILY MEDICINE CLINIC | Facility: CLINIC | Age: 58
End: 2023-12-11

## 2023-12-11 NOTE — TELEPHONE ENCOUNTER
----- Message from Reyes Fragoso, [address] [place] [address] sent at 12/11/2023  8:17 AM EST -----  Jocelin Lovell, All of your labs look great.  Cholesterol numbers are good, you do not have diabetes, prostate cancer screening normal, and your kidney functions, liver functions and thyroid functions all are normal. Repeat labs 1 year

## 2023-12-11 NOTE — RESULT ENCOUNTER NOTE
Trey Lee, All of your labs look great. Cholesterol numbers are good, you do not have diabetes, prostate cancer screening normal, and your kidney functions, liver functions and thyroid functions all are normal. Repeat labs 1 year

## 2023-12-12 NOTE — TELEPHONE ENCOUNTER
Patient aware on results, saw [employer] message from me them on Mychart     Me   to Karlie Carballo   [facility]      12/11/23  2:36 PM  Trey Ventura, All of your labs look great. Cholesterol numbers are good, you do not have diabetes, prostate cancer screening normal, and your kidney functions, liver functions and thyroid functions all are normal. Repeat labs 1 year     Last read by Karlie Carballo at  4:18 PM on 12/11/2023.

## 2023-12-21 DIAGNOSIS — I10 HYPERTENSION, UNSPECIFIED TYPE: ICD-10-CM

## 2023-12-21 RX ORDER — LISINOPRIL 20 MG/1
20 TABLET ORAL DAILY
Qty: 30 TABLET | Refills: 0 | Status: SHIPPED | OUTPATIENT
Start: 2023-12-21 | End: 2023-12-21 | Stop reason: SDUPTHER

## 2023-12-21 RX ORDER — LISINOPRIL 20 MG/1
20 TABLET ORAL DAILY
Qty: 30 TABLET | Refills: 0 | Status: SHIPPED | OUTPATIENT
Start: 2023-12-21

## 2023-12-27 PROBLEM — I10 HYPERTENSION: Status: ACTIVE | Noted: 2023-12-27

## 2024-01-04 ENCOUNTER — OFFICE VISIT (OUTPATIENT)
Dept: FAMILY MEDICINE CLINIC | Facility: CLINIC | Age: 59
End: 2024-01-04

## 2024-01-04 VITALS
TEMPERATURE: 95.7 F | WEIGHT: 234 LBS | HEART RATE: 71 BPM | HEIGHT: 68 IN | BODY MASS INDEX: 35.46 KG/M2 | OXYGEN SATURATION: 98 %

## 2024-01-04 DIAGNOSIS — I10 HYPERTENSION, UNSPECIFIED TYPE: Primary | ICD-10-CM

## 2024-01-04 PROCEDURE — 99213 OFFICE O/P EST LOW 20 MIN: CPT | Performed by: NURSE PRACTITIONER

## 2024-01-04 RX ORDER — HYDROCHLOROTHIAZIDE 25 MG/1
25 TABLET ORAL DAILY
Qty: 30 TABLET | Refills: 2 | Status: SHIPPED | OUTPATIENT
Start: 2024-01-04

## 2024-01-04 NOTE — PATIENT INSTRUCTIONS
Continue lisinopril 20mg  Add hydrochlorothiazide 25mg once daily, take in morning  Update me in 2 weeks with blood pressure readings

## 2024-01-04 NOTE — PROGRESS NOTES
"Name: Jesus Miller      : 1965      MRN: [id number]  Encounter Provider: NILAY Mccormick  Encounter Date: 2024   Encounter department: [facility]    Assessment & Plan     1. Hypertension, unspecified type  Assessment & Plan:  Continue lisinopril 20mg  Add hydrochlorothiazide 25mg once daily, take in morning  Update me in 2 weeks with blood pressure readings    Orders:  -     hydrochlorothiazide (HYDRODIURIL) 25 mg tablet; Take 1 tablet (25 mg total) by mouth daily         Subjective      Here today for blood pressure follow up- BP at home is still elevated close to 170/100's     quit- 2 ppd quit smoked for about 10 years   for [employer]  started on congestion afterward- follows with ENT Dr Irizarry had polyps removed          Review of Systems   Constitutional:  Negative for diaphoresis and fever.   HENT:  Positive for congestion.    Eyes:  Negative for pain, discharge, redness and itching.   Respiratory:  Negative for shortness of breath.    Cardiovascular:  Negative for chest pain, palpitations and leg swelling.   Endocrine: Negative for polydipsia.   Genitourinary:  Negative for frequency.   Musculoskeletal:  Negative for neck pain.   Neurological:  Positive for headaches (rare- more with allergy congestion).   Hematological: Negative.    Psychiatric/Behavioral: Negative.         Current Outpatient Medications on File Prior to Visit   Medication Sig   • glucosamine-chondroitin 500-400 MG tablet Take 1 tablet by mouth 3 (three) times a day   • lisinopril (ZESTRIL) 20 mg tablet Take 1 tablet (20 mg total) by mouth daily   • Misc Natural Products (Prostate Support) 300-15 MG TABS one daily   • Multiple Vitamin (multivitamin) tablet Take 1 tablet by mouth daily       Objective     Pulse 71   Temp (!) 95.7 °F (35.4 °C) (Tympanic)   Ht 5' 8\" (1.727 m)   Wt 106 kg (234 lb)   SpO2 98%   BMI 35.58 kg/m²     Physical Exam  Vitals and nursing note " reviewed.   Constitutional:       Appearance: Normal appearance. He is obese.   HENT:      Head: Normocephalic.   Eyes:      Extraocular Movements: Extraocular movements intact.      Conjunctiva/sclera: Conjunctivae normal.      Pupils: Pupils are equal, round, and reactive to light.   Cardiovascular:      Rate and Rhythm: Normal rate and regular rhythm.      Pulses:           Radial pulses are 1+ on the right side and 1+ on the left side.      Heart sounds: Normal heart sounds.   Pulmonary:      Effort: Pulmonary effort is normal.      Breath sounds: Normal breath sounds.   Musculoskeletal:      Cervical back: Normal range of motion.      Right lower leg: Edema present.      Left lower leg: Edema present.   Skin:     General: Skin is warm and dry.   Neurological:      Mental Status: He is alert and oriented to person, place, and time.   Psychiatric:         Mood and Affect: Mood normal.         Behavior: Behavior normal.       NILAY Mccormick

## 2024-01-23 DIAGNOSIS — I10 HYPERTENSION, UNSPECIFIED TYPE: ICD-10-CM

## 2024-01-23 RX ORDER — HYDROCHLOROTHIAZIDE 25 MG/1
25 TABLET ORAL DAILY
Qty: 30 TABLET | Refills: 2 | Status: SHIPPED | OUTPATIENT
Start: 2024-01-23

## 2024-01-23 RX ORDER — LISINOPRIL 40 MG/1
40 TABLET ORAL DAILY
Qty: 30 TABLET | Refills: 2 | Status: SHIPPED | OUTPATIENT
Start: 2024-01-23

## 2024-01-25 ENCOUNTER — NEW PATIENT (OUTPATIENT)
Dept: URBAN - METROPOLITAN AREA CLINIC 79 | Facility: CLINIC | Age: 59
End: 2024-01-25

## 2024-01-25 DIAGNOSIS — H35.61: ICD-10-CM

## 2024-01-25 DIAGNOSIS — H25.13: ICD-10-CM

## 2024-01-25 DIAGNOSIS — I10: ICD-10-CM

## 2024-01-25 DIAGNOSIS — H34.8320: ICD-10-CM

## 2024-01-25 DIAGNOSIS — D31.32: ICD-10-CM

## 2024-01-25 DIAGNOSIS — H35.031: ICD-10-CM

## 2024-01-25 PROCEDURE — 92250 FUNDUS PHOTOGRAPHY W/I&R: CPT | Mod: NC

## 2024-01-25 PROCEDURE — 92134 CPTRZ OPH DX IMG PST SGM RTA: CPT

## 2024-01-25 PROCEDURE — 92004 COMPRE OPH EXAM NEW PT 1/>: CPT

## 2024-01-25 ASSESSMENT — TONOMETRY
OD_IOP_MMHG: 14
OS_IOP_MMHG: 14

## 2024-01-25 ASSESSMENT — VISUAL ACUITY
OS_PH: 20/20
OS_CC: 20/40
OD_CC: 20/20

## 2024-02-02 DIAGNOSIS — I10 HYPERTENSION, UNSPECIFIED TYPE: ICD-10-CM

## 2024-02-05 DIAGNOSIS — I10 HYPERTENSION, UNSPECIFIED TYPE: ICD-10-CM

## 2024-02-05 RX ORDER — HYDROCHLOROTHIAZIDE 25 MG/1
25 TABLET ORAL DAILY
Qty: 30 TABLET | Refills: 2 | OUTPATIENT
Start: 2024-02-05

## 2024-02-05 RX ORDER — HYDROCHLOROTHIAZIDE 25 MG/1
25 TABLET ORAL DAILY
Qty: 90 TABLET | Refills: 1 | Status: SHIPPED | OUTPATIENT
Start: 2024-02-05

## 2024-02-17 DIAGNOSIS — I10 HYPERTENSION, UNSPECIFIED TYPE: ICD-10-CM

## 2024-02-19 RX ORDER — LISINOPRIL 40 MG/1
40 TABLET ORAL DAILY
Qty: 30 TABLET | Refills: 2 | Status: SHIPPED | OUTPATIENT
Start: 2024-02-19

## 2024-02-22 ENCOUNTER — FOLLOW UP (OUTPATIENT)
Dept: URBAN - METROPOLITAN AREA CLINIC 79 | Facility: CLINIC | Age: 59
End: 2024-02-22

## 2024-02-22 DIAGNOSIS — H34.8320: ICD-10-CM

## 2024-02-22 DIAGNOSIS — H35.031: ICD-10-CM

## 2024-02-22 DIAGNOSIS — H35.61: ICD-10-CM

## 2024-02-22 DIAGNOSIS — I10: ICD-10-CM

## 2024-02-22 DIAGNOSIS — D31.32: ICD-10-CM

## 2024-02-22 DIAGNOSIS — H25.13: ICD-10-CM

## 2024-02-22 PROCEDURE — 92134 CPTRZ OPH DX IMG PST SGM RTA: CPT

## 2024-02-22 PROCEDURE — 92250 FUNDUS PHOTOGRAPHY W/I&R: CPT | Mod: NC

## 2024-02-22 PROCEDURE — 99213 OFFICE O/P EST LOW 20 MIN: CPT

## 2024-02-22 ASSESSMENT — TONOMETRY
OD_IOP_MMHG: 17
OS_IOP_MMHG: 15

## 2024-02-22 ASSESSMENT — VISUAL ACUITY
OS_CC: 20/30
OD_CC: 20/25

## 2024-04-02 ENCOUNTER — FOLLOW UP (OUTPATIENT)
Dept: URBAN - METROPOLITAN AREA CLINIC 79 | Facility: CLINIC | Age: 59
End: 2024-04-02

## 2024-04-02 DIAGNOSIS — H34.8320: ICD-10-CM

## 2024-04-02 DIAGNOSIS — D31.32: ICD-10-CM

## 2024-04-02 DIAGNOSIS — H35.031: ICD-10-CM

## 2024-04-02 DIAGNOSIS — I10: ICD-10-CM

## 2024-04-02 DIAGNOSIS — H25.13: ICD-10-CM

## 2024-04-02 DIAGNOSIS — H35.61: ICD-10-CM

## 2024-04-02 PROCEDURE — 92134 CPTRZ OPH DX IMG PST SGM RTA: CPT

## 2024-04-02 PROCEDURE — 92250 FUNDUS PHOTOGRAPHY W/I&R: CPT | Mod: NC

## 2024-04-02 PROCEDURE — 99213 OFFICE O/P EST LOW 20 MIN: CPT

## 2024-04-02 ASSESSMENT — VISUAL ACUITY
OD_CC: 20/20-
OS_CC: 20/25

## 2024-04-02 ASSESSMENT — TONOMETRY
OS_IOP_MMHG: 13
OD_IOP_MMHG: 14

## 2024-04-12 DIAGNOSIS — I10 HYPERTENSION, UNSPECIFIED TYPE: ICD-10-CM

## 2024-04-12 RX ORDER — HYDROCHLOROTHIAZIDE 25 MG/1
25 TABLET ORAL DAILY
Qty: 90 TABLET | Refills: 1 | Status: SHIPPED | OUTPATIENT
Start: 2024-04-12

## 2024-04-12 RX ORDER — LISINOPRIL 40 MG/1
40 TABLET ORAL DAILY
Qty: 90 TABLET | Refills: 1 | Status: SHIPPED | OUTPATIENT
Start: 2024-04-12

## 2024-06-20 ENCOUNTER — FOLLOW UP (OUTPATIENT)
Dept: URBAN - METROPOLITAN AREA CLINIC 79 | Facility: CLINIC | Age: 59
End: 2024-06-20

## 2024-06-20 DIAGNOSIS — H34.8320: ICD-10-CM

## 2024-06-20 DIAGNOSIS — H35.033: ICD-10-CM

## 2024-06-20 DIAGNOSIS — D31.32: ICD-10-CM

## 2024-06-20 PROCEDURE — 92134 CPTRZ OPH DX IMG PST SGM RTA: CPT

## 2024-06-20 PROCEDURE — 92250 FUNDUS PHOTOGRAPHY W/I&R: CPT | Mod: 59

## 2024-06-20 PROCEDURE — 92014 COMPRE OPH EXAM EST PT 1/>: CPT

## 2024-06-20 PROCEDURE — 92202 OPSCPY EXTND ON/MAC DRAW: CPT | Mod: NC

## 2024-06-20 ASSESSMENT — TONOMETRY
OS_IOP_MMHG: 14
OD_IOP_MMHG: 15

## 2024-06-20 ASSESSMENT — VISUAL ACUITY
OS_CC: 20/20
OD_CC: 20/20

## 2024-09-27 ENCOUNTER — OFFICE VISIT (OUTPATIENT)
Dept: FAMILY MEDICINE CLINIC | Facility: CLINIC | Age: 59
End: 2024-09-27

## 2024-09-27 VITALS
HEIGHT: 69 IN | BODY MASS INDEX: 33.09 KG/M2 | WEIGHT: 223.4 LBS | OXYGEN SATURATION: 97 % | HEART RATE: 81 BPM | SYSTOLIC BLOOD PRESSURE: 130 MMHG | DIASTOLIC BLOOD PRESSURE: 78 MMHG | TEMPERATURE: 99.1 F

## 2024-09-27 DIAGNOSIS — R94.31 ABNORMAL ECG DURING EXERCISE STRESS TEST: Primary | ICD-10-CM

## 2024-09-27 PROCEDURE — 99213 OFFICE O/P EST LOW 20 MIN: CPT | Performed by: NURSE PRACTITIONER

## 2024-09-27 NOTE — PROGRESS NOTES
Ambulatory Visit  Name: Jesus Miller      : 1965      MRN: [id number]  Encounter Provider: NILAY Mccormick  Encounter Date: 2024   Encounter department: [facility]    Assessment & Plan  Abnormal ECG during exercise stress test  Must see cardiology for further testing  Orders:    Ambulatory Referral to Cardiology; Future       History of Present Illness     He went for pre employment physical  He is applying for a position as  with  office  Will be doing investigations    No chest pain, no dyspnea with exertion or at rest, intermittent snoring, no apneic events, no syncopal episodes, no headaches, no dizziness.   Former smoker smoked 15 years 1.5ppd- quit .   Unsure family history      Had labs 24: , HDL 76, LDL 88, , Glucose 104, otherwise CMP normal  PSA 0.49  Hep B non reactive  He does not have immunity to Hep B- he received 3 vaccines in 2018, 2019, 2019 without improvement in immunity    He will walk periodically around his neighborhood or treadmill on and off but nothing recently.    24: had a exercise stress test  through work  Miscellaneous Results - Stress test only, exercise (2024 12:17 PM EDT)  Narrative  MUSESTRESS - 2024 4:50 PM EDT   This result has an attachment that is not available.   ·  Stress ECG changes are consistent with ischemia.  ·  Calculated Duke Treadmill Score of -4 (this is an intermediate risk  score, which indicates a five year survival of 90%).  ·  The patient reported no symptoms and no angina during the stress test.  ·  The patient's exercise capacity was normal for age.    Resting ECG  Baseline ECG indicates sinus rhythm with non-specific ST-T wave changes and inferolateral Q waves with rare PVCs.    Stress Findings  A Raghu protocol stress test was performed. The patient reported no symptoms during the stress test. Blood pressure demonstrated a normal response and heart rate  "demonstrated a normal response to stress. Overall, the patient's exercise capacity was normal for their age. The patient reached stage 3 of the protocol after exercising for 9 min and 0 sec. The patient experienced no angina during the test. The test was stopped due to patient completing protocol.    Stress Hemodynamics  A stress test was performed following the Raghu protocol. (this is an intermediate risk score, which indicates a five year survival of 90%).    Stress ECG  2.50 mm of horizontal ST depression in the inferolateral, II, III, aVF, V3, V4, V5 and V6 leads is noted. ST depression began at minute 2 of stress and ended during recovery. Arrhythmias during stress: occasional PVCs with couplets with triplets, 4 beat run of non-sustained VT. Arrhythmias during recovery: occasional PVCs with couplets. Stress ECG changes are consistent with ischemia.               Review of Systems   Constitutional:  Negative for diaphoresis and fever.   HENT:  Positive for congestion.    Eyes:  Negative for pain, discharge, redness and itching.   Respiratory:  Negative for shortness of breath.    Cardiovascular:  Negative for chest pain, palpitations and leg swelling.   Endocrine: Negative for polydipsia.   Genitourinary:  Negative for frequency.   Musculoskeletal:  Negative for neck pain.   Neurological:  Positive for headaches (rare- more with allergy congestion).   Hematological: Negative.    Psychiatric/Behavioral: Negative.             Objective     /78 (BP Location: Left arm, Patient Position: Sitting, Cuff Size: Large)   Pulse 81   Temp 99.1 °F (37.3 °C) (Tympanic)   Ht 5' 9\" (1.753 m)   Wt 101 kg (223 lb 6.4 oz)   SpO2 97%   BMI 32.99 kg/m²     Physical Exam  Vitals and nursing note reviewed.   Constitutional:       General: He is not in acute distress.     Appearance: Normal appearance. He is well-developed. He is obese.   HENT:      Head: Normocephalic and atraumatic.      Right Ear: Tympanic membrane, ear " canal and external ear normal.      Left Ear: Tympanic membrane, ear canal and external ear normal.      Nose: Nose normal.      Mouth/Throat:      Mouth: Mucous membranes are moist.      Pharynx: Oropharynx is clear.   Eyes:      Conjunctiva/sclera: Conjunctivae normal.      Pupils: Pupils are equal, round, and reactive to light.   Cardiovascular:      Rate and Rhythm: Normal rate and regular rhythm.      Pulses:           Radial pulses are 2+ on the right side and 2+ on the left side.      Heart sounds: Normal heart sounds. No murmur heard.  Pulmonary:      Effort: Pulmonary effort is normal. No respiratory distress.      Breath sounds: Normal breath sounds.   Abdominal:      General: Bowel sounds are normal.      Palpations: Abdomen is soft.      Tenderness: There is no abdominal tenderness.   Musculoskeletal:      Cervical back: Neck supple.      Right lower leg: No edema.      Left lower leg: No edema.   Skin:     General: Skin is warm and dry.   Neurological:      Mental Status: He is alert and oriented to person, place, and time.   Psychiatric:         Mood and Affect: Mood normal.         Behavior: Behavior normal.

## 2024-10-09 DIAGNOSIS — I10 HYPERTENSION, UNSPECIFIED TYPE: ICD-10-CM

## 2024-10-10 RX ORDER — HYDROCHLOROTHIAZIDE 25 MG/1
25 TABLET ORAL DAILY
Qty: 90 TABLET | Refills: 1 | Status: SHIPPED | OUTPATIENT
Start: 2024-10-10

## 2024-10-10 RX ORDER — LISINOPRIL 40 MG/1
40 TABLET ORAL DAILY
Qty: 90 TABLET | Refills: 1 | Status: SHIPPED | OUTPATIENT
Start: 2024-10-10

## 2024-10-12 ENCOUNTER — PATIENT MESSAGE (OUTPATIENT)
Dept: FAMILY MEDICINE CLINIC | Facility: CLINIC | Age: 59
End: 2024-10-12

## 2024-10-17 ENCOUNTER — FOLLOW UP (OUTPATIENT)
Dept: URBAN - METROPOLITAN AREA CLINIC 79 | Facility: CLINIC | Age: 59
End: 2024-10-17

## 2024-10-17 DIAGNOSIS — H34.8320: ICD-10-CM

## 2024-10-17 DIAGNOSIS — D31.32: ICD-10-CM

## 2024-10-17 DIAGNOSIS — H35.033: ICD-10-CM

## 2024-10-17 PROCEDURE — 92014 COMPRE OPH EXAM EST PT 1/>: CPT

## 2024-10-17 PROCEDURE — 92134 CPTRZ OPH DX IMG PST SGM RTA: CPT

## 2024-10-17 PROCEDURE — 92202 OPSCPY EXTND ON/MAC DRAW: CPT

## 2024-10-17 ASSESSMENT — TONOMETRY
OD_IOP_MMHG: 13
OS_IOP_MMHG: 12

## 2024-10-17 ASSESSMENT — VISUAL ACUITY
OD_CC: 20/20
OS_CC: 20/20

## 2024-10-21 NOTE — PATIENT COMMUNICATION
Patient called in regarding message above. He's looking to know if he can change his medication. Please advise.

## 2024-10-24 DIAGNOSIS — I25.10 ATHEROSCLEROSIS OF CORONARY ARTERY OF NATIVE HEART WITHOUT ANGINA PECTORIS, UNSPECIFIED VESSEL OR LESION TYPE: ICD-10-CM

## 2024-10-24 DIAGNOSIS — I10 HYPERTENSION, UNSPECIFIED TYPE: Primary | ICD-10-CM

## 2024-10-24 RX ORDER — ATORVASTATIN CALCIUM 10 MG/1
10 TABLET, FILM COATED ORAL DAILY
Qty: 30 TABLET | Refills: 5 | Status: SHIPPED | OUTPATIENT
Start: 2024-10-24

## 2024-10-24 RX ORDER — AMLODIPINE BESYLATE 10 MG/1
10 TABLET ORAL DAILY
Qty: 30 TABLET | Refills: 5 | Status: SHIPPED | OUTPATIENT
Start: 2024-10-24

## 2024-10-28 ENCOUNTER — TELEPHONE (OUTPATIENT)
Age: 59
End: 2024-10-28

## 2024-10-28 NOTE — TELEPHONE ENCOUNTER
PA for atorvastatin (LIPITOR) 10 mg tablet NOT REQUIRED     Reason (screenshot if applicable)          Patient advised by          [x] MyChart Message  [] Phone call   []LMOM  []L/M to call office as no active Communication consent on file  [x]Spoke with pharmacy and pharmacy having issues getting medication to go through, getting rejection code stating not covered for ages 40-75 years old.       Pharmacy advised by    [x]Fax  []Phone call

## 2025-01-13 ENCOUNTER — RA CDI HCC (OUTPATIENT)
Dept: OTHER | Facility: HOSPITAL | Age: 60
End: 2025-01-13

## 2025-01-17 ENCOUNTER — OFFICE VISIT (OUTPATIENT)
Dept: FAMILY MEDICINE CLINIC | Facility: CLINIC | Age: 60
End: 2025-01-17
Payer: COMMERCIAL

## 2025-01-17 VITALS
OXYGEN SATURATION: 95 % | WEIGHT: 228 LBS | HEIGHT: 69 IN | HEART RATE: 72 BPM | DIASTOLIC BLOOD PRESSURE: 80 MMHG | TEMPERATURE: 97.1 F | SYSTOLIC BLOOD PRESSURE: 122 MMHG | BODY MASS INDEX: 33.77 KG/M2

## 2025-01-17 DIAGNOSIS — J45.40 MODERATE PERSISTENT ASTHMATIC BRONCHITIS WITHOUT COMPLICATION: Primary | ICD-10-CM

## 2025-01-17 PROCEDURE — 99213 OFFICE O/P EST LOW 20 MIN: CPT | Performed by: NURSE PRACTITIONER

## 2025-01-17 RX ORDER — ALBUTEROL SULFATE 90 UG/1
2 INHALANT RESPIRATORY (INHALATION) EVERY 4 HOURS PRN
Qty: 8.5 G | Refills: 1 | Status: SHIPPED | OUTPATIENT
Start: 2025-01-17

## 2025-01-17 RX ORDER — ALBUTEROL SULFATE 90 UG/1
2 INHALANT RESPIRATORY (INHALATION) EVERY 4 HOURS PRN
COMMUNITY
Start: 2025-01-02 | End: 2025-01-17 | Stop reason: SDUPTHER

## 2025-01-17 RX ORDER — BENZONATATE 200 MG/1
200 CAPSULE ORAL 3 TIMES DAILY PRN
Qty: 20 CAPSULE | Refills: 0 | Status: SHIPPED | OUTPATIENT
Start: 2025-01-17

## 2025-01-17 NOTE — PROGRESS NOTES
Name: Jesus Miller      : 1965      MRN: [id number]  Encounter Provider: NILAY Mccormick  Encounter Date: 2025   Encounter department: [facility]  :  Assessment & Plan  Moderate persistent asthmatic bronchitis without complication  Albuterol as needed  Benzonatate 3x daily for cough   Orders:    benzonatate (TESSALON) 200 MG capsule; Take 1 capsule (200 mg total) by mouth 3 (three) times a day as needed for cough    Spacer Device for Inhaler    albuterol (PROVENTIL HFA,VENTOLIN HFA) 90 mcg/act inhaler; Inhale 2 puffs every 4 (four) hours as needed for shortness of breath or wheezing           History of Present Illness     Started right before new years with wet cough and then developed into chest tightness and wheezing. He went to urgent care- albuterol and doxycycline and prednisone taper    Now with persistent cough but feeling better  He has been using mucinex and advil for sinus pressure  No longer with wheezing    He has yet to start statin- he was waiting to see how new job and routine is before starting it for fear of side effects    Cough  This is a new problem. The current episode started 1 to 4 weeks ago. The problem has been unchanged. The problem occurs every few minutes. The cough is Productive of sputum. Associated symptoms include ear congestion, nasal congestion, postnasal drip and wheezing. Pertinent negatives include no chest pain, chills, ear pain, fever, headaches, heartburn, hemoptysis, myalgias, rash, rhinorrhea, sore throat, shortness of breath, sweats or weight loss. Nothing aggravates the symptoms. Risk factors for lung disease include animal exposure.     Review of Systems   Constitutional:  Negative for chills, fever and weight loss.   HENT:  Positive for postnasal drip. Negative for ear pain, rhinorrhea and sore throat.    Respiratory:  Positive for cough and wheezing. Negative for hemoptysis and shortness of breath.    Cardiovascular:  Negative  "for chest pain.   Gastrointestinal:  Negative for heartburn.   Musculoskeletal:  Negative for myalgias.   Skin:  Negative for rash.   Neurological:  Negative for headaches.       Objective   /80 (BP Location: Right arm, Patient Position: Sitting, Cuff Size: Adult)   Pulse 72   Temp (!) 97.1 °F (36.2 °C) (Tympanic)   Ht 5' 9\" (1.753 m)   Wt 103 kg (228 lb)   SpO2 95%   BMI 33.67 kg/m²      Physical Exam  Vitals and nursing note reviewed.   Constitutional:       General: He is not in acute distress.     Appearance: Normal appearance. He is well-developed. He is obese.   HENT:      Head: Normocephalic and atraumatic.      Right Ear: Tympanic membrane, ear canal and external ear normal.      Left Ear: Tympanic membrane, ear canal and external ear normal.      Nose: Nose normal.      Mouth/Throat:      Mouth: Mucous membranes are moist.      Pharynx: Oropharynx is clear.   Eyes:      Conjunctiva/sclera: Conjunctivae normal.      Pupils: Pupils are equal, round, and reactive to light.   Cardiovascular:      Rate and Rhythm: Normal rate and regular rhythm.      Pulses:           Radial pulses are 2+ on the right side and 2+ on the left side.      Heart sounds: Normal heart sounds. No murmur heard.  Pulmonary:      Effort: Pulmonary effort is normal. No respiratory distress.      Breath sounds: Wheezing present. No rhonchi.   Abdominal:      General: Bowel sounds are normal.      Palpations: Abdomen is soft.      Tenderness: There is no abdominal tenderness.   Musculoskeletal:      Cervical back: Neck supple.      Right lower leg: No edema.      Left lower leg: No edema.   Skin:     General: Skin is warm and dry.   Neurological:      Mental Status: He is alert and oriented to person, place, and time.   Psychiatric:         Mood and Affect: Mood normal.         Behavior: Behavior normal.         "

## 2025-01-17 NOTE — ASSESSMENT & PLAN NOTE
Albuterol as needed  Benzonatate 3x daily for cough   Orders:    benzonatate (TESSALON) 200 MG capsule; Take 1 capsule (200 mg total) by mouth 3 (three) times a day as needed for cough    Spacer Device for Inhaler    albuterol (PROVENTIL HFA,VENTOLIN HFA) 90 mcg/act inhaler; Inhale 2 puffs every 4 (four) hours as needed for shortness of breath or wheezing

## 2025-02-11 DIAGNOSIS — I10 HYPERTENSION, UNSPECIFIED TYPE: ICD-10-CM

## 2025-02-11 RX ORDER — AMLODIPINE BESYLATE 10 MG/1
10 TABLET ORAL DAILY
Qty: 90 TABLET | Refills: 1 | Status: SHIPPED | OUTPATIENT
Start: 2025-02-11

## 2025-02-11 RX ORDER — HYDROCHLOROTHIAZIDE 25 MG/1
25 TABLET ORAL DAILY
Qty: 90 TABLET | Refills: 1 | Status: SHIPPED | OUTPATIENT
Start: 2025-02-11

## 2025-06-17 DIAGNOSIS — I10 HYPERTENSION, UNSPECIFIED TYPE: ICD-10-CM

## 2025-06-18 RX ORDER — AMLODIPINE BESYLATE 10 MG/1
10 TABLET ORAL DAILY
Qty: 90 TABLET | Refills: 1 | Status: SHIPPED | OUTPATIENT
Start: 2025-06-18

## 2025-06-18 RX ORDER — HYDROCHLOROTHIAZIDE 25 MG/1
25 TABLET ORAL DAILY
Qty: 90 TABLET | Refills: 1 | Status: SHIPPED | OUTPATIENT
Start: 2025-06-18

## 2025-07-03 LAB — HBA1C MFR BLD HPLC: 5.1 %

## 2025-07-10 ENCOUNTER — OFFICE VISIT (OUTPATIENT)
Dept: FAMILY MEDICINE CLINIC | Facility: CLINIC | Age: 60
End: 2025-07-10
Payer: COMMERCIAL

## 2025-07-10 VITALS
TEMPERATURE: 97 F | WEIGHT: 226 LBS | OXYGEN SATURATION: 98 % | BODY MASS INDEX: 33.47 KG/M2 | RESPIRATION RATE: 18 BRPM | HEART RATE: 82 BPM | HEIGHT: 69 IN | SYSTOLIC BLOOD PRESSURE: 130 MMHG | DIASTOLIC BLOOD PRESSURE: 80 MMHG

## 2025-07-10 DIAGNOSIS — Z13.0 SCREENING FOR DEFICIENCY ANEMIA: ICD-10-CM

## 2025-07-10 DIAGNOSIS — M25.561 CHRONIC PAIN OF BOTH KNEES: ICD-10-CM

## 2025-07-10 DIAGNOSIS — Z12.5 SCREENING FOR PROSTATE CANCER: ICD-10-CM

## 2025-07-10 DIAGNOSIS — Z13.29 SCREENING FOR THYROID DISORDER: ICD-10-CM

## 2025-07-10 DIAGNOSIS — M25.562 CHRONIC PAIN OF BOTH KNEES: ICD-10-CM

## 2025-07-10 DIAGNOSIS — G89.29 CHRONIC PAIN OF BOTH KNEES: ICD-10-CM

## 2025-07-10 DIAGNOSIS — I25.10 ATHEROSCLEROSIS OF CORONARY ARTERY OF NATIVE HEART WITHOUT ANGINA PECTORIS, UNSPECIFIED VESSEL OR LESION TYPE: ICD-10-CM

## 2025-07-10 DIAGNOSIS — Z00.00 ANNUAL PHYSICAL EXAM: Primary | ICD-10-CM

## 2025-07-10 DIAGNOSIS — Z13.1 SCREENING FOR DIABETES MELLITUS: ICD-10-CM

## 2025-07-10 DIAGNOSIS — I10 PRIMARY HYPERTENSION: ICD-10-CM

## 2025-07-10 DIAGNOSIS — R73.01 IMPAIRED FASTING GLUCOSE: ICD-10-CM

## 2025-07-10 PROCEDURE — 99396 PREV VISIT EST AGE 40-64: CPT | Performed by: NURSE PRACTITIONER

## 2025-07-10 NOTE — PATIENT INSTRUCTIONS
Recommend shingles vaccine, call insurance to check coverage at pharmacy vs doctors office  Check blood work on or after 10/3/25 fasting at [facility]- slip given to you today    You can check xrays of both knees- orders are in for the hospital

## 2025-07-10 NOTE — PROGRESS NOTES
Adult Annual Physical  Name: Jesus Miller      : 1965      MRN: [id number]  Encounter Provider: NILAY Mccormick  Encounter Date: 7/10/2025   Encounter department: [facility]    :  Assessment & Plan  Annual physical exam         Primary hypertension  BP at top end normal  Continue HCTZ 25mg daily  Amlodipine 10mg daily           Atherosclerosis of coronary artery of native heart without angina pectoris, unspecified vessel or lesion type  Check blood work on or after 10/3/25 fasting at [facility]- slip given to you today    Orders:    Lipid panel; Future    Screening for deficiency anemia    Orders:    CBC and differential; Future    Screening for diabetes mellitus    Orders:    Comprehensive metabolic panel; Future    Screening for prostate cancer    Orders:    PSA, total and free; Future    Screening for thyroid disorder    Orders:    TSH, 3rd generation with Free T4 reflex; Future    Impaired fasting glucose    Orders:    Hemoglobin A1c (w/out EAG); Future    Chronic pain of both knees  Check xrays  Orders:    XR knee 4+ vw left injury; Future    XR knee 4+ vw right injury; Future        Preventive Screenings:  - Diabetes Screening: screening up-to-date  - Cholesterol Screening: risks/benefits discussed and orders placed   - Colon cancer screening: screening up-to-date   - Lung cancer screening: screening not indicated   - Prostate cancer screening: risks/benefits discussed and orders placed     Immunizations:  - Immunizations due: Tdap and Zoster (Shingrix)  - Risks/benefits immunizations discussed    - The patient declines recommended vaccines currently despite my recommendations      Counseling/Anticipatory Guidance:  - Alcohol: discussed moderation in alcohol intake and recommendations for healthy alcohol use.   - Dental health: discussed importance of regular tooth brushing, flossing, and dental visits.   - Sexual health: discussed sexually transmitted diseases, partner  "selection, use of condoms, avoidance of unintended pregnancy, and contraceptive alternatives.   - Diet: discussed recommendations for a healthy/well-balanced diet.   - Exercise: the importance of regular exercise/physical activity was discussed. Recommend exercise 3-5 times per week for at least 30 minutes.   - Injury prevention: discussed safety/seat belts, safety helmets, smoke detectors, carbon monoxide detectors, and smoking near bedding or upholstery.       Depression Screening and Follow-up Plan: Patient was screened for depression during today's encounter. They screened negative with a PHQ-2 score of 0.          History of Present Illness     Adult Annual Physical:  Patient presents for annual physical.     Diet and Physical Activity:  - Diet/Nutrition: no special diet.  - Exercise: no formal exercise.    Depression Screening:  - PHQ-2 Score: 0    General Health:  - Sleep: 4-6 hours of sleep on average.  - Hearing: normal hearing bilateral ears.  - Vision: wears glasses.  - Dental: regular dental visits.     Health:  - History of STDs: no.   - Urinary symptoms: none.     Advanced Care Planning:  - Has an advanced directive?: no    - Has a durable medical POA?: no      Review of Systems   Constitutional:  Negative for diaphoresis and fever.   Eyes:  Negative for pain, discharge, redness and itching.   Respiratory:  Negative for shortness of breath.    Cardiovascular:  Negative for chest pain, palpitations and leg swelling.   Endocrine: Negative for polydipsia.   Genitourinary:  Negative for frequency.   Musculoskeletal:  Positive for arthralgias. Negative for neck pain.   Neurological:  Positive for headaches (rare- more with allergy congestion).   Hematological: Negative.    Psychiatric/Behavioral: Negative.           Objective   /80 (BP Location: Left arm, Patient Position: Sitting, Cuff Size: Adult)   Pulse 82   Temp (!) 97 °F (36.1 °C) (Tympanic)   Resp 18   Ht 5' 9\" (1.753 m)   Wt 103 kg (226 " lb)   SpO2 98%   BMI 33.37 kg/m²     Physical Exam  Vitals and nursing note reviewed.   Constitutional:       General: He is not in acute distress.     Appearance: Normal appearance. He is well-developed. He is obese.   HENT:      Head: Normocephalic and atraumatic.      Right Ear: Tympanic membrane, ear canal and external ear normal.      Left Ear: Tympanic membrane, ear canal and external ear normal.      Nose: Nose normal.      Mouth/Throat:      Mouth: Mucous membranes are moist.      Pharynx: Oropharynx is clear.     Eyes:      Conjunctiva/sclera: Conjunctivae normal.      Pupils: Pupils are equal, round, and reactive to light.       Cardiovascular:      Rate and Rhythm: Normal rate and regular rhythm.      Pulses:           Radial pulses are 2+ on the right side and 2+ on the left side.      Heart sounds: Normal heart sounds. No murmur heard.  Pulmonary:      Effort: Pulmonary effort is normal. No respiratory distress.      Breath sounds: Normal breath sounds.   Abdominal:      General: Bowel sounds are normal.      Palpations: Abdomen is soft.      Tenderness: There is no abdominal tenderness.     Musculoskeletal:      Cervical back: Neck supple.      Right lower leg: No edema.      Left lower leg: No edema.     Skin:     General: Skin is warm and dry.     Neurological:      Mental Status: He is alert and oriented to person, place, and time.     Psychiatric:         Mood and Affect: Mood normal.         Behavior: Behavior normal.